# Patient Record
Sex: FEMALE | Race: BLACK OR AFRICAN AMERICAN | NOT HISPANIC OR LATINO | Employment: FULL TIME | URBAN - METROPOLITAN AREA
[De-identification: names, ages, dates, MRNs, and addresses within clinical notes are randomized per-mention and may not be internally consistent; named-entity substitution may affect disease eponyms.]

---

## 2023-04-23 ASSESSMENT — PROMIS GLOBAL HEALTH SCALE
CARRYOUT_PHYSICAL_ACTIVITIES: COMPLETELY
RATE_QUALITY_OF_LIFE: VERY GOOD
RATE_PHYSICAL_HEALTH: GOOD
RATE_AVERAGE_FATIGUE: MILD
RATE_SOCIAL_SATISFACTION: VERY GOOD
RATE_MENTAL_HEALTH: VERY GOOD
EMOTIONAL_PROBLEMS: NEVER
RATE_AVERAGE_PAIN: 3
RATE_GENERAL_HEALTH: GOOD
CARRYOUT_SOCIAL_ACTIVITIES: VERY GOOD

## 2023-04-25 ENCOUNTER — LAB (OUTPATIENT)
Dept: LAB | Facility: LAB | Age: 58
End: 2023-04-25
Payer: COMMERCIAL

## 2023-04-25 ENCOUNTER — OFFICE VISIT (OUTPATIENT)
Dept: PRIMARY CARE | Facility: CLINIC | Age: 58
End: 2023-04-25
Payer: COMMERCIAL

## 2023-04-25 VITALS
WEIGHT: 243 LBS | TEMPERATURE: 94.1 F | HEIGHT: 64 IN | OXYGEN SATURATION: 97 % | DIASTOLIC BLOOD PRESSURE: 80 MMHG | SYSTOLIC BLOOD PRESSURE: 136 MMHG | HEART RATE: 74 BPM | BODY MASS INDEX: 41.48 KG/M2

## 2023-04-25 DIAGNOSIS — Z11.4 ENCOUNTER FOR SCREENING FOR HIV: Primary | ICD-10-CM

## 2023-04-25 DIAGNOSIS — Z11.4 ENCOUNTER FOR SCREENING FOR HIV: ICD-10-CM

## 2023-04-25 DIAGNOSIS — Z11.59 ENCOUNTER FOR HEPATITIS C SCREENING TEST FOR LOW RISK PATIENT: ICD-10-CM

## 2023-04-25 DIAGNOSIS — Z12.31 VISIT FOR SCREENING MAMMOGRAM: ICD-10-CM

## 2023-04-25 DIAGNOSIS — Z00.00 HEALTH CARE MAINTENANCE: ICD-10-CM

## 2023-04-25 DIAGNOSIS — Z12.11 COLON CANCER SCREENING: ICD-10-CM

## 2023-04-25 LAB
ALANINE AMINOTRANSFERASE (SGPT) (U/L) IN SER/PLAS: 9 U/L (ref 7–45)
ALBUMIN (G/DL) IN SER/PLAS: 4.1 G/DL (ref 3.4–5)
ALKALINE PHOSPHATASE (U/L) IN SER/PLAS: 90 U/L (ref 33–110)
ANION GAP IN SER/PLAS: 12 MMOL/L (ref 10–20)
ASPARTATE AMINOTRANSFERASE (SGOT) (U/L) IN SER/PLAS: 13 U/L (ref 9–39)
BILIRUBIN TOTAL (MG/DL) IN SER/PLAS: 0.5 MG/DL (ref 0–1.2)
CALCIUM (MG/DL) IN SER/PLAS: 11 MG/DL (ref 8.6–10.6)
CARBON DIOXIDE, TOTAL (MMOL/L) IN SER/PLAS: 28 MMOL/L (ref 21–32)
CHLORIDE (MMOL/L) IN SER/PLAS: 103 MMOL/L (ref 98–107)
CHOLESTEROL (MG/DL) IN SER/PLAS: 165 MG/DL (ref 0–199)
CHOLESTEROL IN HDL (MG/DL) IN SER/PLAS: 65.3 MG/DL
CHOLESTEROL/HDL RATIO: 2.5
CREATININE (MG/DL) IN SER/PLAS: 0.81 MG/DL (ref 0.5–1.05)
ERYTHROCYTE DISTRIBUTION WIDTH (RATIO) BY AUTOMATED COUNT: 18.4 % (ref 11.5–14.5)
ERYTHROCYTE MEAN CORPUSCULAR HEMOGLOBIN CONCENTRATION (G/DL) BY AUTOMATED: 27.7 G/DL (ref 32–36)
ERYTHROCYTE MEAN CORPUSCULAR VOLUME (FL) BY AUTOMATED COUNT: 74 FL (ref 80–100)
ERYTHROCYTES (10*6/UL) IN BLOOD BY AUTOMATED COUNT: 4.38 X10E12/L (ref 4–5.2)
ESTIMATED AVERAGE GLUCOSE FOR HBA1C: 120 MG/DL
GFR FEMALE: 85 ML/MIN/1.73M2
GLUCOSE (MG/DL) IN SER/PLAS: 97 MG/DL (ref 74–99)
HEMATOCRIT (%) IN BLOOD BY AUTOMATED COUNT: 32.5 % (ref 36–46)
HEMOGLOBIN (G/DL) IN BLOOD: 9 G/DL (ref 12–16)
HEMOGLOBIN A1C/HEMOGLOBIN TOTAL IN BLOOD: 5.8 %
HEPATITIS C VIRUS AB PRESENCE IN SERUM: NONREACTIVE
HIV 1/ 2 AG/AB SCREEN: NONREACTIVE
LDL: 83 MG/DL (ref 0–99)
LEUKOCYTES (10*3/UL) IN BLOOD BY AUTOMATED COUNT: 6.1 X10E9/L (ref 4.4–11.3)
NRBC (PER 100 WBCS) BY AUTOMATED COUNT: 0 /100 WBC (ref 0–0)
PLATELETS (10*3/UL) IN BLOOD AUTOMATED COUNT: 499 X10E9/L (ref 150–450)
POTASSIUM (MMOL/L) IN SER/PLAS: 4.2 MMOL/L (ref 3.5–5.3)
PROTEIN TOTAL: 7.8 G/DL (ref 6.4–8.2)
SODIUM (MMOL/L) IN SER/PLAS: 139 MMOL/L (ref 136–145)
THYROTROPIN (MIU/L) IN SER/PLAS BY DETECTION LIMIT <= 0.05 MIU/L: 2.06 MIU/L (ref 0.44–3.98)
TRIGLYCERIDE (MG/DL) IN SER/PLAS: 82 MG/DL (ref 0–149)
UREA NITROGEN (MG/DL) IN SER/PLAS: 17 MG/DL (ref 6–23)
VLDL: 16 MG/DL (ref 0–40)

## 2023-04-25 PROCEDURE — 86803 HEPATITIS C AB TEST: CPT

## 2023-04-25 PROCEDURE — 83036 HEMOGLOBIN GLYCOSYLATED A1C: CPT

## 2023-04-25 PROCEDURE — 1036F TOBACCO NON-USER: CPT | Performed by: FAMILY MEDICINE

## 2023-04-25 PROCEDURE — 87389 HIV-1 AG W/HIV-1&-2 AB AG IA: CPT

## 2023-04-25 PROCEDURE — 80053 COMPREHEN METABOLIC PANEL: CPT

## 2023-04-25 PROCEDURE — 85027 COMPLETE CBC AUTOMATED: CPT

## 2023-04-25 PROCEDURE — 80061 LIPID PANEL: CPT

## 2023-04-25 PROCEDURE — 36415 COLL VENOUS BLD VENIPUNCTURE: CPT

## 2023-04-25 PROCEDURE — 99396 PREV VISIT EST AGE 40-64: CPT | Performed by: FAMILY MEDICINE

## 2023-04-25 PROCEDURE — 84443 ASSAY THYROID STIM HORMONE: CPT

## 2023-04-25 RX ORDER — BIOTIN 1000 MCG
TABLET,CHEWABLE ORAL
COMMUNITY
Start: 2020-06-15

## 2023-04-25 RX ORDER — IBUPROFEN 100 MG/5ML
1 SUSPENSION, ORAL (FINAL DOSE FORM) ORAL DAILY
COMMUNITY
Start: 2020-06-15

## 2023-04-25 ASSESSMENT — PATIENT HEALTH QUESTIONNAIRE - PHQ9
SUM OF ALL RESPONSES TO PHQ9 QUESTIONS 1 AND 2: 0
1. LITTLE INTEREST OR PLEASURE IN DOING THINGS: NOT AT ALL
2. FEELING DOWN, DEPRESSED OR HOPELESS: NOT AT ALL

## 2023-04-25 ASSESSMENT — PAIN SCALES - GENERAL: PAINLEVEL: 0-NO PAIN

## 2023-04-25 ASSESSMENT — ENCOUNTER SYMPTOMS
LOSS OF SENSATION IN FEET: 0
OCCASIONAL FEELINGS OF UNSTEADINESS: 0
DEPRESSION: 0

## 2023-04-25 ASSESSMENT — COLUMBIA-SUICIDE SEVERITY RATING SCALE - C-SSRS
2. HAVE YOU ACTUALLY HAD ANY THOUGHTS OF KILLING YOURSELF?: NO
6. HAVE YOU EVER DONE ANYTHING, STARTED TO DO ANYTHING, OR PREPARED TO DO ANYTHING TO END YOUR LIFE?: NO
1. IN THE PAST MONTH, HAVE YOU WISHED YOU WERE DEAD OR WISHED YOU COULD GO TO SLEEP AND NOT WAKE UP?: NO

## 2023-04-25 NOTE — PROGRESS NOTES
Subjective   Patient ID: Arlene Bray is a 57 y.o. female who presents for No chief complaint on file..  HPI  The patient is here for her CPE.  No concern today.     A review of system was completed.  All systems were reviewed and were normal, except for the ones that are listed in the HPI.    Objective   Physical Exam  Constitutional:       Appearance: Normal appearance.   HENT:      Head: Normocephalic and atraumatic.      Right Ear: Tympanic membrane, ear canal and external ear normal.      Left Ear: Tympanic membrane, ear canal and external ear normal.      Nose: Nose normal.      Mouth/Throat:      Mouth: Mucous membranes are moist.      Pharynx: Oropharynx is clear.   Eyes:      Extraocular Movements: Extraocular movements intact.      Conjunctiva/sclera: Conjunctivae normal.      Pupils: Pupils are equal, round, and reactive to light.   Cardiovascular:      Rate and Rhythm: Normal rate and regular rhythm.      Pulses: Normal pulses.   Pulmonary:      Effort: Pulmonary effort is normal.      Breath sounds: Normal breath sounds.   Abdominal:      General: Abdomen is flat. Bowel sounds are normal.      Palpations: Abdomen is soft.   Musculoskeletal:         General: Normal range of motion.      Cervical back: Normal range of motion and neck supple.   Skin:     General: Skin is warm.   Neurological:      General: No focal deficit present.      Mental Status: She is alert and oriented to person, place, and time. Mental status is at baseline.   Psychiatric:         Mood and Affect: Mood normal.         Behavior: Behavior normal.         Thought Content: Thought content normal.         Judgment: Judgment normal.         Assessment/Plan   Problem List Items Addressed This Visit          Other    Encounter for screening for HIV - Primary    Relevant Orders    HIV 1/2 Antigen/Antibody Screen with Reflex to Confirmation    Encounter for hepatitis C screening test for low risk patient    Relevant Orders    Hepatitis C  Antibody    Visit for screening mammogram    Relevant Orders    BI mammo bilateral screening tomosynthesis    Colon cancer screening    Relevant Orders    Fecal Occult Blood Immunoassy    Colonoscopy    Health care maintenance    Relevant Medications    zoster vaccine-recombinant adjuvanted (Shingrix) 50 mcg/0.5 mL vaccine    Other Relevant Orders    HIV 1/2 Antigen/Antibody Screen with Reflex to Confirmation    Hepatitis C Antibody    BI mammo bilateral screening tomosynthesis    CBC    Comprehensive Metabolic Panel    Hemoglobin A1C    Lipid Panel    TSH with reflex to Free T4 if abnormal    Fecal Occult Blood Immunoassy

## 2023-05-11 ENCOUNTER — TELEPHONE (OUTPATIENT)
Dept: PRIMARY CARE | Facility: CLINIC | Age: 58
End: 2023-05-11

## 2023-10-09 DIAGNOSIS — E78.00 PURE HYPERCHOLESTEROLEMIA: ICD-10-CM

## 2023-10-09 NOTE — TELEPHONE ENCOUNTER
Pt is requesting a refill for pravastatin please send to [facility] pharmacy.

## 2023-10-13 RX ORDER — PRAVASTATIN SODIUM 10 MG/1
10 TABLET ORAL DAILY
Qty: 30 TABLET | Refills: 0 | Status: SHIPPED | OUTPATIENT
Start: 2023-10-13 | End: 2023-11-26

## 2023-12-29 DIAGNOSIS — Z00.00 HEALTH CARE MAINTENANCE: ICD-10-CM

## 2024-01-05 RX ORDER — OMEPRAZOLE 20 MG/1
20 CAPSULE, DELAYED RELEASE ORAL DAILY
Qty: 90 CAPSULE | Refills: 1 | Status: SHIPPED | OUTPATIENT
Start: 2024-01-05

## 2024-06-23 ASSESSMENT — PROMIS GLOBAL HEALTH SCALE
RATE_AVERAGE_FATIGUE: MILD
CARRYOUT_SOCIAL_ACTIVITIES: VERY GOOD
RATE_AVERAGE_PAIN: 3
RATE_GENERAL_HEALTH: GOOD
EMOTIONAL_PROBLEMS: NEVER
CARRYOUT_PHYSICAL_ACTIVITIES: COMPLETELY
RATE_SOCIAL_SATISFACTION: GOOD
RATE_QUALITY_OF_LIFE: VERY GOOD
RATE_MENTAL_HEALTH: EXCELLENT
RATE_PHYSICAL_HEALTH: GOOD

## 2024-06-24 ENCOUNTER — LAB (OUTPATIENT)
Dept: LAB | Facility: LAB | Age: 59
End: 2024-06-24
Payer: COMMERCIAL

## 2024-06-24 ENCOUNTER — APPOINTMENT (OUTPATIENT)
Dept: PRIMARY CARE | Facility: CLINIC | Age: 59
End: 2024-06-24
Payer: MEDICARE

## 2024-06-24 VITALS
SYSTOLIC BLOOD PRESSURE: 132 MMHG | DIASTOLIC BLOOD PRESSURE: 63 MMHG | HEART RATE: 59 BPM | TEMPERATURE: 98 F | BODY MASS INDEX: 42.72 KG/M2 | WEIGHT: 245 LBS

## 2024-06-24 DIAGNOSIS — Z78.0 ASYMPTOMATIC MENOPAUSE: ICD-10-CM

## 2024-06-24 DIAGNOSIS — D50.9 IRON DEFICIENCY ANEMIA, UNSPECIFIED IRON DEFICIENCY ANEMIA TYPE: ICD-10-CM

## 2024-06-24 DIAGNOSIS — Z12.31 VISIT FOR SCREENING MAMMOGRAM: ICD-10-CM

## 2024-06-24 DIAGNOSIS — Z13.220 SCREENING CHOLESTEROL LEVEL: ICD-10-CM

## 2024-06-24 DIAGNOSIS — Z00.00 HEALTH CARE MAINTENANCE: ICD-10-CM

## 2024-06-24 DIAGNOSIS — Z13.1 DIABETES MELLITUS SCREENING: ICD-10-CM

## 2024-06-24 DIAGNOSIS — E78.00 PURE HYPERCHOLESTEROLEMIA: ICD-10-CM

## 2024-06-24 DIAGNOSIS — I10 PRIMARY HYPERTENSION: ICD-10-CM

## 2024-06-24 DIAGNOSIS — D50.9 IRON DEFICIENCY ANEMIA, UNSPECIFIED IRON DEFICIENCY ANEMIA TYPE: Primary | ICD-10-CM

## 2024-06-24 DIAGNOSIS — Z12.11 COLON CANCER SCREENING: ICD-10-CM

## 2024-06-24 PROBLEM — F51.04 CHRONIC INSOMNIA: Status: ACTIVE | Noted: 2024-06-24

## 2024-06-24 PROBLEM — N94.6 DYSMENORRHEA: Status: ACTIVE | Noted: 2024-06-24

## 2024-06-24 PROBLEM — K21.9 GERD (GASTROESOPHAGEAL REFLUX DISEASE): Status: ACTIVE | Noted: 2024-06-24

## 2024-06-24 PROBLEM — Z98.890 HISTORY OF CRYOSURGERY: Status: ACTIVE | Noted: 2024-06-24

## 2024-06-24 PROBLEM — Z86.69 HISTORY OF MIGRAINE HEADACHES: Status: ACTIVE | Noted: 2023-06-27

## 2024-06-24 PROBLEM — I65.29 CAROTID ARTERY PLAQUE: Status: ACTIVE | Noted: 2024-06-24

## 2024-06-24 PROBLEM — F32.A CONTROLLED DEPRESSION: Status: ACTIVE | Noted: 2024-06-24

## 2024-06-24 PROBLEM — G43.909 MIGRAINE, UNSPECIFIED, NOT INTRACTABLE, WITHOUT STATUS MIGRAINOSUS: Status: ACTIVE | Noted: 2024-06-24

## 2024-06-24 PROBLEM — E78.5 HYPERLIPIDEMIA: Status: ACTIVE | Noted: 2024-06-24

## 2024-06-24 PROBLEM — E04.0 GOITER DIFFUSE, NONTOXIC: Status: ACTIVE | Noted: 2024-06-24

## 2024-06-24 LAB
ALBUMIN SERPL BCP-MCNC: 4.2 G/DL (ref 3.4–5)
ALP SERPL-CCNC: 94 U/L (ref 33–110)
ALT SERPL W P-5'-P-CCNC: 10 U/L (ref 7–45)
ANION GAP SERPL CALC-SCNC: 11 MMOL/L (ref 10–20)
AST SERPL W P-5'-P-CCNC: 14 U/L (ref 9–39)
BILIRUB SERPL-MCNC: 0.5 MG/DL (ref 0–1.2)
BUN SERPL-MCNC: 17 MG/DL (ref 6–23)
CALCIUM SERPL-MCNC: 10.3 MG/DL (ref 8.6–10.6)
CHLORIDE SERPL-SCNC: 107 MMOL/L (ref 98–107)
CHOLEST SERPL-MCNC: 160 MG/DL (ref 0–199)
CHOLESTEROL/HDL RATIO: 2.4
CO2 SERPL-SCNC: 28 MMOL/L (ref 21–32)
CREAT SERPL-MCNC: 0.63 MG/DL (ref 0.5–1.05)
EGFRCR SERPLBLD CKD-EPI 2021: >90 ML/MIN/1.73M*2
ERYTHROCYTE [DISTWIDTH] IN BLOOD BY AUTOMATED COUNT: 13.5 % (ref 11.5–14.5)
EST. AVERAGE GLUCOSE BLD GHB EST-MCNC: 103 MG/DL
GLUCOSE SERPL-MCNC: 95 MG/DL (ref 74–99)
HBA1C MFR BLD: 5.2 %
HCT VFR BLD AUTO: 43.2 % (ref 36–46)
HDLC SERPL-MCNC: 67.3 MG/DL
HGB BLD-MCNC: 13.4 G/DL (ref 12–16)
LDLC SERPL CALC-MCNC: 82 MG/DL
MCH RBC QN AUTO: 28 PG (ref 26–34)
MCHC RBC AUTO-ENTMCNC: 31 G/DL (ref 32–36)
MCV RBC AUTO: 90 FL (ref 80–100)
NON HDL CHOLESTEROL: 93 MG/DL (ref 0–149)
NRBC BLD-RTO: 0 /100 WBCS (ref 0–0)
PLATELET # BLD AUTO: 283 X10*3/UL (ref 150–450)
POTASSIUM SERPL-SCNC: 4.4 MMOL/L (ref 3.5–5.3)
PROT SERPL-MCNC: 7.7 G/DL (ref 6.4–8.2)
RBC # BLD AUTO: 4.79 X10*6/UL (ref 4–5.2)
SODIUM SERPL-SCNC: 142 MMOL/L (ref 136–145)
TRIGL SERPL-MCNC: 54 MG/DL (ref 0–149)
TSH SERPL-ACNC: 1.26 MIU/L (ref 0.44–3.98)
VLDL: 11 MG/DL (ref 0–40)
WBC # BLD AUTO: 5.3 X10*3/UL (ref 4.4–11.3)

## 2024-06-24 PROCEDURE — 36415 COLL VENOUS BLD VENIPUNCTURE: CPT

## 2024-06-24 PROCEDURE — 83036 HEMOGLOBIN GLYCOSYLATED A1C: CPT

## 2024-06-24 PROCEDURE — 84443 ASSAY THYROID STIM HORMONE: CPT

## 2024-06-24 PROCEDURE — 1036F TOBACCO NON-USER: CPT | Performed by: FAMILY MEDICINE

## 2024-06-24 PROCEDURE — 85027 COMPLETE CBC AUTOMATED: CPT

## 2024-06-24 PROCEDURE — 99396 PREV VISIT EST AGE 40-64: CPT | Performed by: FAMILY MEDICINE

## 2024-06-24 PROCEDURE — 80053 COMPREHEN METABOLIC PANEL: CPT

## 2024-06-24 PROCEDURE — 80061 LIPID PANEL: CPT

## 2024-06-24 PROCEDURE — 3078F DIAST BP <80 MM HG: CPT | Performed by: FAMILY MEDICINE

## 2024-06-24 PROCEDURE — 3075F SYST BP GE 130 - 139MM HG: CPT | Performed by: FAMILY MEDICINE

## 2024-06-24 RX ORDER — OMEPRAZOLE 20 MG/1
20 CAPSULE, DELAYED RELEASE ORAL DAILY
Qty: 90 CAPSULE | Refills: 1 | Status: SHIPPED | OUTPATIENT
Start: 2024-06-24

## 2024-06-24 RX ORDER — AMLODIPINE BESYLATE 5 MG/1
5 TABLET ORAL
Qty: 90 TABLET | Refills: 1 | Status: SHIPPED | OUTPATIENT
Start: 2024-06-24

## 2024-06-24 RX ORDER — AMLODIPINE BESYLATE 5 MG/1
5 TABLET ORAL
COMMUNITY
Start: 2024-04-21 | End: 2024-06-24 | Stop reason: SDUPTHER

## 2024-06-24 RX ORDER — PRAVASTATIN SODIUM 10 MG/1
10 TABLET ORAL DAILY
Qty: 90 TABLET | Refills: 1 | Status: SHIPPED | OUTPATIENT
Start: 2024-06-24

## 2024-06-24 NOTE — ASSESSMENT & PLAN NOTE
-colonoscopy 12/2023.  -mammogram 5/4/23.  Ordered.   -pap smear 8/18/2022.   Wnl.   -blood test 4/25/2023.  Ordered.

## 2024-06-24 NOTE — PROGRESS NOTES
Subjective   Patient ID: Arlene Bray is a 58 y.o. female who presents for Annual Exam.  HPI  The patient is a 57 yo AA Female with a history of KACI HLD HTN, GERD, here for:    1- CPE.  -colonoscopy 12/2023.  -mammogram 5/4/23.  Ordered.   -pap smear 8/18/2022.   Wnl.   -blood test 4/25/2023.  Ordered.    2- KACI follow-up.  Last Hb: 8.8 (6/27/23).  Colonoscopy and EGD done at [facility] in 12/2023.     A review of system was completed.  All systems were reviewed and were normal, except for the ones that are listed in the HPI.    Objective   Physical Exam  Constitutional:       Appearance: Normal appearance.   HENT:      Head: Normocephalic and atraumatic.      Right Ear: Tympanic membrane, ear canal and external ear normal.      Left Ear: Tympanic membrane, ear canal and external ear normal.      Nose: Nose normal.      Mouth/Throat:      Mouth: Mucous membranes are moist.      Pharynx: Oropharynx is clear.   Eyes:      Extraocular Movements: Extraocular movements intact.      Conjunctiva/sclera: Conjunctivae normal.      Pupils: Pupils are equal, round, and reactive to light.   Cardiovascular:      Rate and Rhythm: Normal rate and regular rhythm.      Pulses: Normal pulses.   Pulmonary:      Effort: Pulmonary effort is normal.      Breath sounds: Normal breath sounds.   Abdominal:      General: Abdomen is flat. Bowel sounds are normal.      Palpations: Abdomen is soft.   Musculoskeletal:         General: Normal range of motion.      Cervical back: Normal range of motion and neck supple.   Skin:     General: Skin is warm.   Neurological:      General: No focal deficit present.      Mental Status: She is alert and oriented to person, place, and time. Mental status is at baseline.   Psychiatric:         Mood and Affect: Mood normal.         Behavior: Behavior normal.         Thought Content: Thought content normal.         Judgment: Judgment normal.         Assessment/Plan   Problem List Items Addressed This Visit        Visit for screening mammogram    Relevant Orders    BI mammo bilateral screening tomosynthesis    Colon cancer screening    Health care maintenance     -colonoscopy 12/2023.  -mammogram 5/4/23.  Ordered.   -pap smear 8/18/2022.   Wnl.   -blood test 4/25/2023.  Ordered.         Relevant Medications    omeprazole (PriLOSEC) 20 mg DR capsule    amLODIPine (Norvasc) 5 mg tablet    Other Relevant Orders    CBC    Comprehensive Metabolic Panel    TSH with reflex to Free T4 if abnormal    Lipid Panel    Hemoglobin A1C    BI mammo bilateral screening tomosynthesis    Hyperlipidemia    Relevant Medications    pravastatin (Pravachol) 10 mg tablet    Iron deficiency anemia - Primary     -Etiology insure.   -Blood test repeated.  -Referral to hematologist done.          Relevant Orders    Referral to Benign Hematology    CBC    Comprehensive Metabolic Panel    TSH with reflex to Free T4 if abnormal    Screening cholesterol level    Relevant Orders    Lipid Panel    Diabetes mellitus screening    Relevant Orders    Hemoglobin A1C    Asymptomatic menopause     Other Visit Diagnoses       Primary hypertension        Relevant Medications    amLODIPine (Norvasc) 5 mg tablet         Patient to return to office in 6 months.

## 2024-07-03 ENCOUNTER — PATIENT MESSAGE (OUTPATIENT)
Dept: PRIMARY CARE | Facility: CLINIC | Age: 59
End: 2024-07-03
Payer: MEDICARE

## 2024-07-03 DIAGNOSIS — E78.00 PURE HYPERCHOLESTEROLEMIA: Primary | ICD-10-CM

## 2024-07-19 ENCOUNTER — HOSPITAL ENCOUNTER (OUTPATIENT)
Dept: RADIOLOGY | Facility: CLINIC | Age: 59
Discharge: HOME | End: 2024-07-19
Payer: MEDICARE

## 2024-07-19 VITALS — BODY MASS INDEX: 39.99 KG/M2 | HEIGHT: 65 IN | WEIGHT: 240 LBS

## 2024-07-19 DIAGNOSIS — Z12.31 VISIT FOR SCREENING MAMMOGRAM: ICD-10-CM

## 2024-07-19 DIAGNOSIS — Z00.00 HEALTH CARE MAINTENANCE: ICD-10-CM

## 2024-07-19 PROCEDURE — 77063 BREAST TOMOSYNTHESIS BI: CPT

## 2024-07-29 ENCOUNTER — TELEPHONE (OUTPATIENT)
Dept: PRIMARY CARE | Facility: CLINIC | Age: 59
End: 2024-07-29
Payer: MEDICARE

## 2024-07-30 ENCOUNTER — HOSPITAL ENCOUNTER (OUTPATIENT)
Dept: RADIOLOGY | Facility: HOSPITAL | Age: 59
Discharge: HOME | End: 2024-07-30
Payer: MEDICARE

## 2024-07-30 DIAGNOSIS — E78.00 PURE HYPERCHOLESTEROLEMIA: ICD-10-CM

## 2024-07-30 PROCEDURE — 75571 CT HRT W/O DYE W/CA TEST: CPT

## 2024-09-16 ENCOUNTER — TELEPHONE (OUTPATIENT)
Dept: HEMATOLOGY/ONCOLOGY | Facility: CLINIC | Age: 59
End: 2024-09-16
Payer: MEDICARE

## 2024-09-18 ENCOUNTER — LAB (OUTPATIENT)
Dept: LAB | Facility: LAB | Age: 59
End: 2024-09-18
Payer: MEDICARE

## 2024-09-18 DIAGNOSIS — D50.8 IRON DEFICIENCY ANEMIA SECONDARY TO INADEQUATE DIETARY IRON INTAKE: ICD-10-CM

## 2024-09-18 LAB
ALBUMIN SERPL BCP-MCNC: 4 G/DL (ref 3.4–5)
ALP SERPL-CCNC: 93 U/L (ref 33–110)
ALT SERPL W P-5'-P-CCNC: 14 U/L (ref 7–45)
ANION GAP SERPL CALCULATED.3IONS-SCNC: 12 MMOL/L (ref 10–20)
AST SERPL W P-5'-P-CCNC: 19 U/L (ref 9–39)
BASOPHILS # BLD AUTO: 0.03 X10*3/UL (ref 0–0.1)
BASOPHILS NFR BLD AUTO: 0.5 %
BILIRUB SERPL-MCNC: 0.4 MG/DL (ref 0–1.2)
BUN SERPL-MCNC: 12 MG/DL (ref 6–23)
CALCIUM SERPL-MCNC: 9.8 MG/DL (ref 8.6–10.3)
CHLORIDE SERPL-SCNC: 108 MMOL/L (ref 98–107)
CO2 SERPL-SCNC: 22 MMOL/L (ref 21–32)
CREAT SERPL-MCNC: 0.64 MG/DL (ref 0.5–1.05)
EGFRCR SERPLBLD CKD-EPI 2021: >90 ML/MIN/1.73M*2
EOSINOPHIL # BLD AUTO: 0.22 X10*3/UL (ref 0–0.7)
EOSINOPHIL NFR BLD AUTO: 3.9 %
ERYTHROCYTE [DISTWIDTH] IN BLOOD BY AUTOMATED COUNT: 13.9 % (ref 11.5–14.5)
FERRITIN SERPL-MCNC: 80 NG/ML (ref 8–150)
FOLATE SERPL-MCNC: 20.5 NG/ML
GLUCOSE SERPL-MCNC: 95 MG/DL (ref 74–99)
HCT VFR BLD AUTO: 42.5 % (ref 36–46)
HGB BLD-MCNC: 13.4 G/DL (ref 12–16)
IMM GRANULOCYTES # BLD AUTO: 0.01 X10*3/UL (ref 0–0.7)
IMM GRANULOCYTES NFR BLD AUTO: 0.2 % (ref 0–0.9)
IRON SATN MFR SERPL: 17 % (ref 25–45)
IRON SERPL-MCNC: 61 UG/DL (ref 35–150)
LYMPHOCYTES # BLD AUTO: 2.29 X10*3/UL (ref 1.2–4.8)
LYMPHOCYTES NFR BLD AUTO: 40.8 %
MCH RBC QN AUTO: 27.9 PG (ref 26–34)
MCHC RBC AUTO-ENTMCNC: 31.5 G/DL (ref 32–36)
MCV RBC AUTO: 89 FL (ref 80–100)
MONOCYTES # BLD AUTO: 0.5 X10*3/UL (ref 0.1–1)
MONOCYTES NFR BLD AUTO: 8.9 %
NEUTROPHILS # BLD AUTO: 2.56 X10*3/UL (ref 1.2–7.7)
NEUTROPHILS NFR BLD AUTO: 45.7 %
NRBC BLD-RTO: 0 /100 WBCS (ref 0–0)
PLATELET # BLD AUTO: 348 X10*3/UL (ref 150–450)
POTASSIUM SERPL-SCNC: 4.9 MMOL/L (ref 3.5–5.3)
PROT SERPL-MCNC: 7.7 G/DL (ref 6.4–8.2)
RBC # BLD AUTO: 4.8 X10*6/UL (ref 4–5.2)
SODIUM SERPL-SCNC: 137 MMOL/L (ref 136–145)
TIBC SERPL-MCNC: 349 UG/DL (ref 240–445)
UIBC SERPL-MCNC: 288 UG/DL (ref 110–370)
VIT B12 SERPL-MCNC: 542 PG/ML (ref 211–911)
WBC # BLD AUTO: 5.6 X10*3/UL (ref 4.4–11.3)

## 2024-09-18 PROCEDURE — 82607 VITAMIN B-12: CPT

## 2024-09-18 PROCEDURE — 82746 ASSAY OF FOLIC ACID SERUM: CPT

## 2024-09-18 PROCEDURE — 83540 ASSAY OF IRON: CPT

## 2024-09-18 PROCEDURE — 83550 IRON BINDING TEST: CPT

## 2024-09-18 PROCEDURE — 82728 ASSAY OF FERRITIN: CPT

## 2024-09-18 PROCEDURE — 85025 COMPLETE CBC W/AUTO DIFF WBC: CPT

## 2024-09-18 PROCEDURE — 80053 COMPREHEN METABOLIC PANEL: CPT

## 2024-09-18 PROCEDURE — 36415 COLL VENOUS BLD VENIPUNCTURE: CPT

## 2024-09-23 ENCOUNTER — PATIENT OUTREACH (OUTPATIENT)
Dept: HEMATOLOGY/ONCOLOGY | Facility: CLINIC | Age: 59
End: 2024-09-23
Payer: MEDICARE

## 2024-09-23 ENCOUNTER — OFFICE VISIT (OUTPATIENT)
Dept: HEMATOLOGY/ONCOLOGY | Facility: CLINIC | Age: 59
End: 2024-09-23
Payer: MEDICARE

## 2024-09-23 VITALS
OXYGEN SATURATION: 98 % | TEMPERATURE: 96.7 F | SYSTOLIC BLOOD PRESSURE: 137 MMHG | RESPIRATION RATE: 17 BRPM | HEIGHT: 63 IN | BODY MASS INDEX: 44.16 KG/M2 | WEIGHT: 249.23 LBS | DIASTOLIC BLOOD PRESSURE: 84 MMHG | HEART RATE: 66 BPM

## 2024-09-23 DIAGNOSIS — D50.9 IRON DEFICIENCY ANEMIA, UNSPECIFIED IRON DEFICIENCY ANEMIA TYPE: ICD-10-CM

## 2024-09-23 DIAGNOSIS — D50.8 IRON DEFICIENCY ANEMIA SECONDARY TO INADEQUATE DIETARY IRON INTAKE: Primary | ICD-10-CM

## 2024-09-23 PROCEDURE — 99214 OFFICE O/P EST MOD 30 MIN: CPT | Performed by: INTERNAL MEDICINE

## 2024-09-23 PROCEDURE — 99204 OFFICE O/P NEW MOD 45 MIN: CPT | Performed by: INTERNAL MEDICINE

## 2024-09-23 PROCEDURE — 3008F BODY MASS INDEX DOCD: CPT | Performed by: INTERNAL MEDICINE

## 2024-09-23 SDOH — ECONOMIC STABILITY: FOOD INSECURITY: WITHIN THE PAST 12 MONTHS, THE FOOD YOU BOUGHT JUST DIDN'T LAST AND YOU DIDN'T HAVE MONEY TO GET MORE.: NEVER TRUE

## 2024-09-23 SDOH — ECONOMIC STABILITY: GENERAL
WHICH OF THE FOLLOWING DO YOU KNOW HOW TO USE AND HAVE ACCESS TO EVERY DAY? (CHOOSE ALL THAT APPLY): DESKTOP COMPUTER, LAPTOP COMPUTER, OR TABLET WITH BROADBAND INTERNET CONNECTION;SMARTPHONE WITH CELLULAR DATA PLAN

## 2024-09-23 SDOH — HEALTH STABILITY: PHYSICAL HEALTH: ON AVERAGE, HOW MANY DAYS PER WEEK DO YOU ENGAGE IN MODERATE TO STRENUOUS EXERCISE (LIKE A BRISK WALK)?: 4 DAYS

## 2024-09-23 SDOH — ECONOMIC STABILITY: INCOME INSECURITY: IN THE LAST 12 MONTHS, WAS THERE A TIME WHEN YOU WERE NOT ABLE TO PAY THE MORTGAGE OR RENT ON TIME?: NO

## 2024-09-23 SDOH — ECONOMIC STABILITY: FOOD INSECURITY: WITHIN THE PAST 12 MONTHS, YOU WORRIED THAT YOUR FOOD WOULD RUN OUT BEFORE YOU GOT MONEY TO BUY MORE.: NEVER TRUE

## 2024-09-23 SDOH — HEALTH STABILITY: PHYSICAL HEALTH: ON AVERAGE, HOW MANY MINUTES DO YOU ENGAGE IN EXERCISE AT THIS LEVEL?: 40 MIN

## 2024-09-23 SDOH — ECONOMIC STABILITY: TRANSPORTATION INSECURITY
IN THE PAST 12 MONTHS, HAS THE LACK OF TRANSPORTATION KEPT YOU FROM MEDICAL APPOINTMENTS OR FROM GETTING MEDICATIONS?: NO

## 2024-09-23 SDOH — ECONOMIC STABILITY: TRANSPORTATION INSECURITY
IN THE PAST 12 MONTHS, HAS LACK OF TRANSPORTATION KEPT YOU FROM MEETINGS, WORK, OR FROM GETTING THINGS NEEDED FOR DAILY LIVING?: NO

## 2024-09-23 ASSESSMENT — SOCIAL DETERMINANTS OF HEALTH (SDOH)
IN A TYPICAL WEEK, HOW MANY TIMES DO YOU TALK ON THE PHONE WITH FAMILY, FRIENDS, OR NEIGHBORS?: MORE THAN THREE TIMES A WEEK
DO YOU BELONG TO ANY CLUBS OR ORGANIZATIONS SUCH AS CHURCH GROUPS UNIONS, FRATERNAL OR ATHLETIC GROUPS, OR SCHOOL GROUPS?: NO
ARE YOU MARRIED, WIDOWED, DIVORCED, SEPARATED, NEVER MARRIED, OR LIVING WITH A PARTNER?: LIVING WITH PARTNER
WITHIN THE LAST YEAR, HAVE TO BEEN RAPED OR FORCED TO HAVE ANY KIND OF SEXUAL ACTIVITY BY YOUR PARTNER OR EX-PARTNER?: NO
HOW OFTEN DO YOU GET TOGETHER WITH FRIENDS OR RELATIVES?: MORE THAN THREE TIMES A WEEK
HOW OFTEN DO YOU ATTEND CHURCH OR RELIGIOUS SERVICES?: MORE THAN 4 TIMES PER YEAR
WITHIN THE LAST YEAR, HAVE YOU BEEN HUMILIATED OR EMOTIONALLY ABUSED IN OTHER WAYS BY YOUR PARTNER OR EX-PARTNER?: NO
HOW HARD IS IT FOR YOU TO PAY FOR THE VERY BASICS LIKE FOOD, HOUSING, MEDICAL CARE, AND HEATING?: NOT HARD AT ALL
IN THE PAST 12 MONTHS, HAS THE ELECTRIC, GAS, OIL, OR WATER COMPANY THREATENED TO SHUT OFF SERVICE IN YOUR HOME?: NO
WITHIN THE LAST YEAR, HAVE YOU BEEN AFRAID OF YOUR PARTNER OR EX-PARTNER?: NO
WITHIN THE LAST YEAR, HAVE YOU BEEN KICKED, HIT, SLAPPED, OR OTHERWISE PHYSICALLY HURT BY YOUR PARTNER OR EX-PARTNER?: NO
HOW OFTEN DO YOU ATTENT MEETINGS OF THE CLUB OR ORGANIZATION YOU BELONG TO?: MORE THAN 4 TIMES PER YEAR

## 2024-09-23 ASSESSMENT — LIFESTYLE VARIABLES
HOW OFTEN DO YOU HAVE SIX OR MORE DRINKS ON ONE OCCASION: NEVER
SKIP TO QUESTIONS 9-10: 1
HOW MANY STANDARD DRINKS CONTAINING ALCOHOL DO YOU HAVE ON A TYPICAL DAY: 1 OR 2
HOW OFTEN DO YOU HAVE A DRINK CONTAINING ALCOHOL: MONTHLY OR LESS
AUDIT-C TOTAL SCORE: 1

## 2024-09-23 ASSESSMENT — PAIN SCALES - GENERAL: PAINLEVEL: 0-NO PAIN

## 2024-09-23 ASSESSMENT — COLUMBIA-SUICIDE SEVERITY RATING SCALE - C-SSRS
2. HAVE YOU ACTUALLY HAD ANY THOUGHTS OF KILLING YOURSELF?: NO
1. IN THE PAST MONTH, HAVE YOU WISHED YOU WERE DEAD OR WISHED YOU COULD GO TO SLEEP AND NOT WAKE UP?: NO
6. HAVE YOU EVER DONE ANYTHING, STARTED TO DO ANYTHING, OR PREPARED TO DO ANYTHING TO END YOUR LIFE?: NO

## 2024-09-23 ASSESSMENT — PATIENT HEALTH QUESTIONNAIRE - PHQ9
1. LITTLE INTEREST OR PLEASURE IN DOING THINGS: NOT AT ALL
SUM OF ALL RESPONSES TO PHQ9 QUESTIONS 1 AND 2: 0
2. FEELING DOWN, DEPRESSED OR HOPELESS: NOT AT ALL

## 2024-09-23 ASSESSMENT — ENCOUNTER SYMPTOMS
LOSS OF SENSATION IN FEET: 0
DEPRESSION: 0
OCCASIONAL FEELINGS OF UNSTEADINESS: 0

## 2024-09-23 NOTE — PROGRESS NOTES
Patient ID: Arlene Brya is a 58 y.o. female.  Referring Physician: Rula Gonzales MD  [id number]  [place] [address]  Primary Care Provider: Rula Gonzales MD  Referral Reason:     Subjective: no complaints      Heme/Onc History:  The patient is a 57 yo AA Female with a history of KACI HLD HTN, GERD, here for:     1- CPE.  -colonoscopy 12/2023.  -mammogram 5/4/23.  Ordered.   -pap smear 8/18/2022.   Wnl.   -blood test 4/25/2023.  Ordered.     2- KACI follow-up.  Last Hb: 8.8 (6/27/23).  Colonoscopy and EGD done at [facility] in 12/2023.         Past Medical History:   Past Medical History:   Diagnosis Date    Otitis media, unspecified, right ear 03/22/2016    Acute otitis media, right    Pain in unspecified foot     Foot pain    Personal history of other endocrine, nutritional and metabolic disease 09/30/2019    History of hypokalemia    Personal history of other specified conditions 03/25/2020    History of palpitations     Social History:   Social History     Socioeconomic History    Marital status: Single     Spouse name: Not on file    Number of children: Not on file    Years of education: Not on file    Highest education level: Not on file   Occupational History    Not on file   Tobacco Use    Smoking status: Never     Passive exposure: Never    Smokeless tobacco: Never   Substance and Sexual Activity    Alcohol use: Yes     Comment: ocassionally    Drug use: Never    Sexual activity: Not on file   Other Topics Concern    Not on file   Social History Narrative    Not on file     Social Determinants of Health     Financial Resource Strain: Low Risk  (9/23/2024)    Overall Financial Resource Strain (CARDIA)     Difficulty of Paying Living Expenses: Not hard at all   Food Insecurity: No Food Insecurity (9/23/2024)    Hunger Vital Sign     Worried About Running Out of Food in the Last Year: Never true     Ran Out of Food in the Last Year: Never true   Transportation Needs:  No Transportation Needs (9/23/2024)    PRAPARE - Transportation     Lack of Transportation (Medical): No     Lack of Transportation (Non-Medical): No   Physical Activity: Sufficiently Active (9/23/2024)    Exercise Vital Sign     Days of Exercise per Week: 4 days     Minutes of Exercise per Session: 40 min   Stress: No Stress Concern Present (9/23/2024)    [employer] [place] [employer] - Occupational Stress Questionnaire     Feeling of Stress : Only a little   Social Connections: Socially Integrated (9/23/2024)    Social Connection and Isolation Panel [NHANES]     Frequency of Communication with Friends and Family: More than three times a week     Frequency of Social Gatherings with Friends and Family: More than three times a week     Attends Restorationism Services: More than 4 times per year     Active Member of Clubs or Organizations: No     Attends Club or Organization Meetings: More than 4 times per year     Marital Status: Living with partner   Intimate Partner Violence: Not At Risk (9/23/2024)    Humiliation, Afraid, Rape, and Kick questionnaire     Fear of Current or Ex-Partner: No     Emotionally Abused: No     Physically Abused: No     Sexually Abused: No   Housing Stability: Unknown (9/23/2024)    Housing Stability Vital Sign     Unable to Pay for Housing in the Last Year: No     Number of Times Moved in the Last Year: Not on file     Homeless in the Last Year: No     Surgical History:   Past Surgical History:   Procedure Laterality Date    MR HEAD ANGIO WO IV CONTRAST  8/30/2019    MR HEAD ANGIO WO IV CONTRAST 8/30/2019 [facility]    MR HEAD ANGIO WO IV CONTRAST  6/8/2022    MR HEAD ANGIO WO IV CONTRAST 6/8/2022 [facility]    TUBAL LIGATION  04/12/2016    Tubal Ligation     Family History: No family history on file.   reports that she has never smoked. She has never been exposed to tobacco smoke. She has never used smokeless tobacco.  Oncology Family history: Cancer-related  "family history is not on file.    Review Of Systems:  As stated per in HPI; otherwise all other 12 point ROS are negative    Physical Exam:  /84 (BP Location: Left arm, Patient Position: Sitting, BP Cuff Size: Adult long)   Pulse 66   Temp 35.9 °C (96.7 °F) (Temporal)   Resp 17   Ht (S) 1.61 m (5' 3.39\")   Wt 113 kg (249 lb 3.7 oz)   SpO2 98%   BMI 43.61 kg/m²   BSA: 2.25 meters squared  General: awake/alert/oriented x3, no distress, alert and cooperative  Head: Short hair fully covering scalp. Symmetric facial expressions  Eyes: PERRL, EOMI, clear sclera, eyebrows present.  Ears/Nose/Mouth/Throat:  Oral mucous membranes moist. No oral ulcers. No palpable pre/post-auricular lymph nodes  Neck: No palpable cervical chain lymph nodes  Respiratory: unlabored breathing on room air, good chest expansion, thorax symmetric  Cardio: Regular rate and rhythm, normal S1 and S2, radial pulses symmetric  GI: Nondistended, soft, non-tender abdomen  Musculoskeletal: Normal muscle bulk and tone, ROM intact, no joint swelling.  Rises from chair and walks unassisted.  Extremities: No ankle swelling, no arm or leg wounds  Neuro: Alert, cognition intact, speech normal. Facial expressions symmetric.  No motor deficits noted. Sensation intact to touch and hot/cold.   Able to stand from seated position unassisted and walks around the room unassisted.  Psychological: Appropriate mood and behavior.  Skin: Warm and dry, no lesions, no rashes    Results:  Diagnostic Results   Lab Results   Component Value Date    WBC 5.6 09/18/2024    HGB 13.4 09/18/2024    HCT 42.5 09/18/2024    MCV 89 09/18/2024     09/18/2024     Lab Results   Component Value Date    CALCIUM 9.8 09/18/2024     09/18/2024    K 4.9 09/18/2024    CO2 22 09/18/2024     (H) 09/18/2024    BUN 12 09/18/2024    CREATININE 0.64 09/18/2024    ALT 14 09/18/2024    AST 19 09/18/2024       Current Outpatient Medications:     amLODIPine (Norvasc) 5 mg " tablet, Take 1 tablet (5 mg) by mouth once daily., Disp: 90 tablet, Rfl: 1    ascorbic acid (Vitamin C) 1,000 mg tablet, Take 1 tablet (1,000 mg) by mouth once daily., Disp: , Rfl:     Bacillus subtilis-inulin 1.5 billion cell-1 gram tablet,chewable, Chew., Disp: , Rfl:     biotin 1,000 mcg tablet,chewable, Chew., Disp: , Rfl:     omeprazole (PriLOSEC) 20 mg DR capsule, Take 1 capsule (20 mg) by mouth once daily., Disp: 90 capsule, Rfl: 1    pravastatin (Pravachol) 10 mg tablet, Take 1 tablet (10 mg) by mouth once daily., Disp: 90 tablet, Rfl: 1     Radiology:    Pathology:    Assessment/Plan:  ? KACI:    She had KACI in 2023 which has resolved with oral iron BID. She is asymptomatic. Iron saturation is mildly low at %17 but no need to treat.     If she develops recurrent KACI, she may be restarted on oral iron 3 times weekly.    No need for hematology follow up.    Diagnoses and all orders for this visit:  Iron deficiency anemia secondary to inadequate dietary iron intake  -     Iron and TIBC; Future  -     Vitamin B12; Future  -     Ferritin; Future  -     Comprehensive Metabolic Panel; Future  -     Folate; Future  -     CBC and Auto Differential; Future  Iron deficiency anemia, unspecified iron deficiency anemia type  -     Referral to Benign Hematology       Performance Status: Asymptomatic    I spent more than 30 minutes for the patient today, including face-to-face conversation, pre-visit preparation, post-visit orders, and others.   Toña Engel MD                          I was present for and supervised the key/critical aspects of the procedures performed during the care of the patient.

## 2024-09-23 NOTE — PROGRESS NOTES
Pt seen in office today for a NPV with Dr. Toña Engel for management of her KACI. She has been referred to our office by Dr. Marcello Reynolds.  She is  without complaints today and denies pain. She brings her 8 month old granddaughter , Benigno, to today's visit.    Medications, pharmacy preference and allergies were reviewed with patient and updated in the medical record by MD.     Per orders, labs were obtained on 9/18/24 and results are to be reviewed by MD with patient during visit today. Per Dr. Engel, she no longer needs to be seen in our office and has been encouraged to contact us with any needs or concerns in the future.    Our contact information was given to patient and they were encouraged to contact us with any questions or concerns.     Patient verbalized understanding and agreement regarding discussed information via verbal feedback. Pt escorted to scheduling.

## 2024-11-07 ENCOUNTER — APPOINTMENT (OUTPATIENT)
Dept: PRIMARY CARE | Facility: CLINIC | Age: 59
End: 2024-11-07
Payer: COMMERCIAL

## 2024-11-07 VITALS
WEIGHT: 249 LBS | DIASTOLIC BLOOD PRESSURE: 82 MMHG | HEART RATE: 61 BPM | SYSTOLIC BLOOD PRESSURE: 131 MMHG | BODY MASS INDEX: 43.57 KG/M2 | TEMPERATURE: 96.6 F

## 2024-11-07 DIAGNOSIS — E78.00 PURE HYPERCHOLESTEROLEMIA: ICD-10-CM

## 2024-11-07 DIAGNOSIS — Z23 IMMUNIZATION DUE: Primary | ICD-10-CM

## 2024-11-07 DIAGNOSIS — I10 PRIMARY HYPERTENSION: ICD-10-CM

## 2024-11-07 DIAGNOSIS — K21.9 GASTROESOPHAGEAL REFLUX DISEASE WITHOUT ESOPHAGITIS: ICD-10-CM

## 2024-11-07 RX ORDER — PRAVASTATIN SODIUM 10 MG/1
10 TABLET ORAL DAILY
Qty: 90 TABLET | Refills: 1 | Status: SHIPPED | OUTPATIENT
Start: 2024-11-07

## 2024-11-07 RX ORDER — AMLODIPINE BESYLATE 5 MG/1
5 TABLET ORAL
Qty: 90 TABLET | Refills: 1 | Status: SHIPPED | OUTPATIENT
Start: 2024-11-07

## 2024-11-07 RX ORDER — OMEPRAZOLE 20 MG/1
20 CAPSULE, DELAYED RELEASE ORAL DAILY
Qty: 90 CAPSULE | Refills: 1 | Status: SHIPPED | OUTPATIENT
Start: 2024-11-07

## 2024-11-07 ASSESSMENT — PAIN SCALES - GENERAL: PAINLEVEL_OUTOF10: 0-NO PAIN

## 2024-11-07 NOTE — PROGRESS NOTES
PSubjective   Patient ID: Arlene Bray is a 59 y.o. female who presents for Follow-up.  HPI  The patient is a 60 yo AA Female with a history of KACI HLD HTN, GERD, here for a 6 months follow-up visit.  Her BP is controlled. She is requesting an Influenza vaccine.     A review of system was completed.  All systems were reviewed and were normal, except for the ones that are listed in the HPI.    Objective   Physical Exam  Constitutional:       Appearance: Normal appearance.   HENT:      Head: Normocephalic and atraumatic.      Right Ear: Tympanic membrane, ear canal and external ear normal.      Left Ear: Tympanic membrane, ear canal and external ear normal.      Nose: Nose normal.      Mouth/Throat:      Mouth: Mucous membranes are moist.      Pharynx: Oropharynx is clear.   Eyes:      Extraocular Movements: Extraocular movements intact.      Conjunctiva/sclera: Conjunctivae normal.      Pupils: Pupils are equal, round, and reactive to light.   Cardiovascular:      Rate and Rhythm: Normal rate and regular rhythm.      Pulses: Normal pulses.   Pulmonary:      Effort: Pulmonary effort is normal.      Breath sounds: Normal breath sounds.   Abdominal:      General: Abdomen is flat. Bowel sounds are normal.      Palpations: Abdomen is soft.   Musculoskeletal:         General: Normal range of motion.      Cervical back: Normal range of motion and neck supple.   Skin:     General: Skin is warm.   Neurological:      General: No focal deficit present.      Mental Status: She is alert and oriented to person, place, and time. Mental status is at baseline.   Psychiatric:         Mood and Affect: Mood normal.         Behavior: Behavior normal.         Thought Content: Thought content normal.         Judgment: Judgment normal.     Assessment/Plan   Problem List Items Addressed This Visit       GERD (gastroesophageal reflux disease)    Relevant Medications    omeprazole (PriLOSEC) 20 mg DR capsule    Hyperlipidemia     -Continue  Pravastatin 10 mg at bedtime.  Refilled.          Relevant Medications    pravastatin (Pravachol) 10 mg tablet    Primary hypertension     -Controlled with Amlodipine 5 mg every day . Refilled.          Relevant Medications    amLODIPine (Norvasc) 5 mg tablet    Immunization due - Primary    Patient to return to office in 6 months for routine CPE.

## 2025-03-25 ENCOUNTER — TELEMEDICINE (OUTPATIENT)
Dept: PRIMARY CARE | Facility: CLINIC | Age: 60
End: 2025-03-25
Payer: MEDICARE

## 2025-03-25 DIAGNOSIS — E78.00 PURE HYPERCHOLESTEROLEMIA: Primary | ICD-10-CM

## 2025-03-25 DIAGNOSIS — I10 PRIMARY HYPERTENSION: Primary | ICD-10-CM

## 2025-03-25 DIAGNOSIS — G43.009 MIGRAINE WITHOUT AURA AND WITHOUT STATUS MIGRAINOSUS, NOT INTRACTABLE: ICD-10-CM

## 2025-03-25 PROCEDURE — 99213 OFFICE O/P EST LOW 20 MIN: CPT | Performed by: FAMILY MEDICINE

## 2025-03-25 RX ORDER — PRAVASTATIN SODIUM 10 MG/1
10 TABLET ORAL DAILY
Qty: 90 TABLET | Refills: 1 | Status: CANCELLED | OUTPATIENT
Start: 2025-03-25

## 2025-03-25 NOTE — PROGRESS NOTES
Subjective   Patient ID: Arlene Bray is a 59 y.o. female who presents for medication refill.   HPI  The patient is a 60 yo AA Female with a history of KACI HLD HTN, GERD, here for a refill of her propranolol.  She is taking it for her palpitations and blood pressure control.    A review of system was completed.  All systems were reviewed and were normal, except for the ones that are listed in the HPI.    Objective   Physical Exam    Assessment/Plan   Problem List Items Addressed This Visit       Migraine, unspecified, not intractable, without status migrainosus     -Migraine headaches controlled.  -Propranolol refilled today.         Relevant Medications    propranolol XL (Innopran XL) 120 mg 24 hr capsule    Primary hypertension - Primary    Relevant Medications    propranolol XL (Innopran XL) 120 mg 24 hr capsule    Patient to return to office in 4-6 months for your physical exam.